# Patient Record
Sex: FEMALE | Race: WHITE | Employment: PART TIME | URBAN - METROPOLITAN AREA
[De-identification: names, ages, dates, MRNs, and addresses within clinical notes are randomized per-mention and may not be internally consistent; named-entity substitution may affect disease eponyms.]

---
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
## 2021-05-06 ENCOUNTER — HOSPITAL ENCOUNTER (OUTPATIENT)
Dept: ULTRASOUND IMAGING | Age: 21
Discharge: HOME | End: 2021-05-06
Payer: COMMERCIAL

## 2021-05-06 ENCOUNTER — HOSPITAL ENCOUNTER (OUTPATIENT)
Dept: WOMENS IMAGING | Age: 21
End: 2021-05-06
Payer: COMMERCIAL

## 2021-05-06 DIAGNOSIS — N63.0 LUMP OR MASS IN BREAST: ICD-10-CM

## 2021-05-06 PROCEDURE — 76642 ULTRASOUND BREAST LIMITED: CPT

## 2021-05-14 ENCOUNTER — OFFICE VISIT (OUTPATIENT)
Dept: PRIMARY CARE CLINIC | Age: 21
End: 2021-05-14
Payer: COMMERCIAL

## 2021-05-14 DIAGNOSIS — Z01.818 PRE-OP EXAMINATION: Primary | ICD-10-CM

## 2021-05-14 PROCEDURE — 99421 OL DIG E/M SVC 5-10 MIN: CPT | Performed by: NURSE PRACTITIONER

## 2021-05-15 LAB — SARS-COV-2: NOT DETECTED

## 2021-05-17 RX ORDER — DIPHENHYDRAMINE HYDROCHLORIDE 12.5 MG/1
12.5 BAR, CHEWABLE ORAL EVERY 6 HOURS PRN
COMMUNITY

## 2021-05-17 RX ORDER — CETIRIZINE HYDROCHLORIDE 10 MG/1
10 TABLET ORAL DAILY
COMMUNITY

## 2021-05-17 RX ORDER — DUPILUMAB 300 MG/2ML
INJECTION, SOLUTION SUBCUTANEOUS
COMMUNITY
Start: 2021-04-26

## 2021-05-17 RX ORDER — LEVONORGESTREL / ETHINYL ESTRADIOL AND ETHINYL ESTRADIOL 150-30(84)
KIT ORAL DAILY
COMMUNITY
Start: 2021-03-08

## 2021-05-17 NOTE — PROGRESS NOTES
C-Difficile admission screening and protocol:     * Admitted with diarrhea? n     *Prior history of C-Diff. In last 3 months?n     *Antibiotic use in the past 6-8 weeks?n     *Prior hospitalization or nursing home in the last month?n        [address] time____9__        Surgery time____________    Take the following medications with a sip of water:    Do not eat or drink anything after 12:00 midnight prior to your surgery. This includes water chewing gum, mints and ice chips. You may brush your teeth and gargle the morning of your surgery, but do not swallow the water     Please see your family doctor/pediatrician for a history and physical and/or concerning medications. Bring any test results/reports from your physicians office. If you are under the care of a heart doctor or specialist doctor, please be aware that you may be asked to them for clearance    You may be asked to stop blood thinners such as Coumadin, Plavix, Fragmin, Lovenox, etc., or any anti-inflammatories such as:  Aspirin, Ibuprofen, Advil, Naproxen prior to your surgery. We also ask that you stop any OTC medications such as fish oil, vitamin E, glucosamine, garlic, Multivitamins, COQ 10, etc.    We ask that you do not smoke 24 hours prior to surgery  We ask that you do not  drink any alcoholic beverages 24 hours prior to surgery     You must make arrangements for a responsible adult to take you home after your surgery. For your safety you will not be allowed to leave alone or drive yourself home. Your surgery will be cancelled if you do not have a ride home. Also for your safety, it is strongly suggested that someone stay with you the first 24 hours after your surgery. A parent or legal guardian must accompany a child scheduled for surgery and plan to stay at the hospital until the child is discharged. Please do not bring other children with you.     For your comfort, please wear simple loose fitting clothing to the hospital.  Please do not bring valuables. Do not wear any make-up or nail polish on your fingers or toes      For your safety, please do not wear any jewelry or body piercing's on the day of surgery. All jewelry must be removed. If you have dentures, they will be removed before going to operating room. For your convenience, we will provide you with a container. If you wear contact lenses or glasses, they will be removed, please bring a case for them. If you have a living will and a durable power of  for healthcare, please bring in a copy. As part of our patient safety program to minimize surgical site infections, we ask you to do the following:    · Please notify your surgeon if you develop any illness between         now and the  day of your surgery. · This includes a cough, cold, fever, sore throat, nausea,         or vomiting, and diarrhea, etc.  ·  Please notify your surgeon if you experience dizziness, shortness         of breath or blurred vision between now and the time of your surgery. Do not shave your operative site 96 hours prior to surgery. For face and neck surgery, men may use an electric razor 48 hours   prior to surgery. You may shower the night before surgery or the morning of   your surgery with an antibacterial soap. You will need to bring a photo ID and insurance card    [facility] has an onsite pharmacy, would you like to utilize our pharmacy     If you will be staying overnight and use a C-pap machine, please bring   your C-pap to hospital     Our goal is to provide you with excellent care, therefore, visitors will be limited to two(2) in the room at a time so that we may focus on providing this care for you. Please contact pre-admission testing if you have any further questions.                  [facility] phone number:  [phone] PAT fax number:  [phone]  Please note these are generalized instructions for all surgical cases, you may be provided with more specific instructions according to your surgery. Preoperative Screening for Elective Surgery/Invasive Procedures While COVID-19 present in the community     Have you tested positive or have been told to self-isolate for COVID-19 like symptoms within the past 28 days? n   Do you currently have any of the following symptoms? n  o Fever >100.0 F or 99.9 F in immunocompromised patients?n  o New onset cough, shortness of breath or difficulty breathing?n  o New onset sore throat, myalgia (muscle aches and pains), headache, loss of taste/smell or diarrhea?n   Have you had a potential exposure to COVID-19 within the past 14 days by:  o Close contact with a confirmed case?n  o Close contact with a healthcare worker,  or essential infrastructure worker (grocery store, [employer], gas station, public utilities or transportation)? YES  o Do you reside in a congregate setting such as; skilled nursing facility, adult home, correctional facility, homeless shelter or other institutional setting? n  o Have you had recent travel to a known COVID-19 hotspot?n    Indicate if the patient has a positive screen by answering yes to one or more of the above questions. Patients who test positive or screen positive prior to surgery or on the day of surgery should be evaluated in conjunction with the surgeon/proceduralist/anesthesiologist to determine the urgency of the procedure.

## 2021-05-19 ENCOUNTER — ANESTHESIA EVENT (OUTPATIENT)
Dept: OPERATING ROOM | Age: 21
End: 2021-05-19
Payer: COMMERCIAL

## 2021-05-20 ENCOUNTER — HOSPITAL ENCOUNTER (OUTPATIENT)
Age: 21
Setting detail: OUTPATIENT SURGERY
Discharge: HOME | End: 2021-05-20
Attending: SURGERY | Admitting: SURGERY
Payer: COMMERCIAL

## 2021-05-20 ENCOUNTER — ANESTHESIA (OUTPATIENT)
Dept: OPERATING ROOM | Age: 21
End: 2021-05-20
Payer: COMMERCIAL

## 2021-05-20 VITALS
WEIGHT: 135 LBS | HEIGHT: 66 IN | DIASTOLIC BLOOD PRESSURE: 72 MMHG | RESPIRATION RATE: 18 BRPM | TEMPERATURE: 97.8 F | HEART RATE: 78 BPM | OXYGEN SATURATION: 99 % | BODY MASS INDEX: 21.69 KG/M2 | SYSTOLIC BLOOD PRESSURE: 112 MMHG

## 2021-05-20 VITALS — OXYGEN SATURATION: 99 % | DIASTOLIC BLOOD PRESSURE: 55 MMHG | SYSTOLIC BLOOD PRESSURE: 91 MMHG

## 2021-05-20 DIAGNOSIS — N63.20 MASS OF LEFT BREAST: ICD-10-CM

## 2021-05-20 LAB — PREGNANCY, URINE: NEGATIVE

## 2021-05-20 PROCEDURE — 3700000001 HC ADD 15 MINUTES (ANESTHESIA): Performed by: SURGERY

## 2021-05-20 PROCEDURE — 3600000002 HC SURGERY LEVEL 2 BASE: Performed by: SURGERY

## 2021-05-20 PROCEDURE — 84703 CHORIONIC GONADOTROPIN ASSAY: CPT

## 2021-05-20 PROCEDURE — 2580000003 HC RX 258: Performed by: SURGERY

## 2021-05-20 PROCEDURE — 7100000010 HC PHASE II RECOVERY - FIRST 15 MIN: Performed by: SURGERY

## 2021-05-20 PROCEDURE — 6360000002 HC RX W HCPCS: Performed by: NURSE ANESTHETIST, CERTIFIED REGISTERED

## 2021-05-20 PROCEDURE — 7100000011 HC PHASE II RECOVERY - ADDTL 15 MIN: Performed by: SURGERY

## 2021-05-20 PROCEDURE — 7100000000 HC PACU RECOVERY - FIRST 15 MIN: Performed by: SURGERY

## 2021-05-20 PROCEDURE — 2500000003 HC RX 250 WO HCPCS: Performed by: SURGERY

## 2021-05-20 PROCEDURE — 6370000000 HC RX 637 (ALT 250 FOR IP): Performed by: ANESTHESIOLOGY

## 2021-05-20 PROCEDURE — 2580000003 HC RX 258: Performed by: ANESTHESIOLOGY

## 2021-05-20 PROCEDURE — 88305 TISSUE EXAM BY PATHOLOGIST: CPT

## 2021-05-20 PROCEDURE — 7100000001 HC PACU RECOVERY - ADDTL 15 MIN: Performed by: SURGERY

## 2021-05-20 PROCEDURE — 3600000012 HC SURGERY LEVEL 2 ADDTL 15MIN: Performed by: SURGERY

## 2021-05-20 PROCEDURE — 2709999900 HC NON-CHARGEABLE SUPPLY: Performed by: SURGERY

## 2021-05-20 PROCEDURE — 2500000003 HC RX 250 WO HCPCS: Performed by: NURSE ANESTHETIST, CERTIFIED REGISTERED

## 2021-05-20 PROCEDURE — 3700000000 HC ANESTHESIA ATTENDED CARE: Performed by: SURGERY

## 2021-05-20 RX ORDER — MORPHINE SULFATE 2 MG/ML
1 INJECTION, SOLUTION INTRAMUSCULAR; INTRAVENOUS EVERY 5 MIN PRN
Status: DISCONTINUED | OUTPATIENT
Start: 2021-05-20 | End: 2021-05-20 | Stop reason: HOSPADM

## 2021-05-20 RX ORDER — SODIUM CHLORIDE 9 MG/ML
25 INJECTION, SOLUTION INTRAVENOUS PRN
Status: DISCONTINUED | OUTPATIENT
Start: 2021-05-20 | End: 2021-05-20 | Stop reason: HOSPADM

## 2021-05-20 RX ORDER — LIDOCAINE HYDROCHLORIDE 20 MG/ML
INJECTION, SOLUTION EPIDURAL; INFILTRATION; INTRACAUDAL; PERINEURAL PRN
Status: DISCONTINUED | OUTPATIENT
Start: 2021-05-20 | End: 2021-05-20 | Stop reason: SDUPTHER

## 2021-05-20 RX ORDER — FENTANYL CITRATE 50 UG/ML
INJECTION, SOLUTION INTRAMUSCULAR; INTRAVENOUS PRN
Status: DISCONTINUED | OUTPATIENT
Start: 2021-05-20 | End: 2021-05-20 | Stop reason: SDUPTHER

## 2021-05-20 RX ORDER — PROPOFOL 10 MG/ML
INJECTION, EMULSION INTRAVENOUS CONTINUOUS PRN
Status: DISCONTINUED | OUTPATIENT
Start: 2021-05-20 | End: 2021-05-20 | Stop reason: SDUPTHER

## 2021-05-20 RX ORDER — ONDANSETRON 2 MG/ML
4 INJECTION INTRAMUSCULAR; INTRAVENOUS
Status: DISCONTINUED | OUTPATIENT
Start: 2021-05-20 | End: 2021-05-20 | Stop reason: HOSPADM

## 2021-05-20 RX ORDER — MEPERIDINE HYDROCHLORIDE 25 MG/ML
12.5 INJECTION INTRAMUSCULAR; INTRAVENOUS; SUBCUTANEOUS EVERY 5 MIN PRN
Status: DISCONTINUED | OUTPATIENT
Start: 2021-05-20 | End: 2021-05-20 | Stop reason: HOSPADM

## 2021-05-20 RX ORDER — MAGNESIUM HYDROXIDE 1200 MG/15ML
LIQUID ORAL CONTINUOUS PRN
Status: COMPLETED | OUTPATIENT
Start: 2021-05-20 | End: 2021-05-20

## 2021-05-20 RX ORDER — MIDAZOLAM HYDROCHLORIDE 1 MG/ML
INJECTION INTRAMUSCULAR; INTRAVENOUS PRN
Status: DISCONTINUED | OUTPATIENT
Start: 2021-05-20 | End: 2021-05-20 | Stop reason: SDUPTHER

## 2021-05-20 RX ORDER — OXYCODONE HYDROCHLORIDE AND ACETAMINOPHEN 5; 325 MG/1; MG/1
2 TABLET ORAL PRN
Status: COMPLETED | OUTPATIENT
Start: 2021-05-20 | End: 2021-05-20

## 2021-05-20 RX ORDER — HYDROCODONE BITARTRATE AND ACETAMINOPHEN 5; 325 MG/1; MG/1
1 TABLET ORAL EVERY 6 HOURS PRN
Qty: 8 TABLET | Refills: 0 | Status: SHIPPED | OUTPATIENT
Start: 2021-05-20 | End: 2021-05-25

## 2021-05-20 RX ORDER — PROPOFOL 10 MG/ML
INJECTION, EMULSION INTRAVENOUS PRN
Status: DISCONTINUED | OUTPATIENT
Start: 2021-05-20 | End: 2021-05-20 | Stop reason: SDUPTHER

## 2021-05-20 RX ORDER — LIDOCAINE HYDROCHLORIDE 10 MG/ML
INJECTION, SOLUTION INFILTRATION; PERINEURAL
Status: COMPLETED | OUTPATIENT
Start: 2021-05-20 | End: 2021-05-20

## 2021-05-20 RX ORDER — ONDANSETRON 4 MG/1
4 TABLET, FILM COATED ORAL EVERY 8 HOURS PRN
Qty: 2 TABLET | Refills: 1 | Status: SHIPPED | OUTPATIENT
Start: 2021-05-20

## 2021-05-20 RX ORDER — MORPHINE SULFATE 2 MG/ML
2 INJECTION, SOLUTION INTRAMUSCULAR; INTRAVENOUS EVERY 5 MIN PRN
Status: DISCONTINUED | OUTPATIENT
Start: 2021-05-20 | End: 2021-05-20 | Stop reason: HOSPADM

## 2021-05-20 RX ORDER — OXYCODONE HYDROCHLORIDE AND ACETAMINOPHEN 5; 325 MG/1; MG/1
1 TABLET ORAL PRN
Status: COMPLETED | OUTPATIENT
Start: 2021-05-20 | End: 2021-05-20

## 2021-05-20 RX ORDER — LABETALOL HYDROCHLORIDE 5 MG/ML
5 INJECTION, SOLUTION INTRAVENOUS EVERY 10 MIN PRN
Status: DISCONTINUED | OUTPATIENT
Start: 2021-05-20 | End: 2021-05-20 | Stop reason: HOSPADM

## 2021-05-20 RX ORDER — SODIUM CHLORIDE 0.9 % (FLUSH) 0.9 %
5-40 SYRINGE (ML) INJECTION EVERY 12 HOURS SCHEDULED
Status: DISCONTINUED | OUTPATIENT
Start: 2021-05-20 | End: 2021-05-20 | Stop reason: HOSPADM

## 2021-05-20 RX ORDER — SODIUM CHLORIDE 9 MG/ML
INJECTION, SOLUTION INTRAVENOUS CONTINUOUS
Status: DISCONTINUED | OUTPATIENT
Start: 2021-05-20 | End: 2021-05-20 | Stop reason: HOSPADM

## 2021-05-20 RX ORDER — SODIUM CHLORIDE 0.9 % (FLUSH) 0.9 %
5-40 SYRINGE (ML) INJECTION PRN
Status: DISCONTINUED | OUTPATIENT
Start: 2021-05-20 | End: 2021-05-20 | Stop reason: HOSPADM

## 2021-05-20 RX ORDER — HYDRALAZINE HYDROCHLORIDE 20 MG/ML
5 INJECTION INTRAMUSCULAR; INTRAVENOUS
Status: DISCONTINUED | OUTPATIENT
Start: 2021-05-20 | End: 2021-05-20 | Stop reason: HOSPADM

## 2021-05-20 RX ADMIN — FENTANYL CITRATE 50 MCG: 50 INJECTION INTRAMUSCULAR; INTRAVENOUS at 10:58

## 2021-05-20 RX ADMIN — SODIUM CHLORIDE: 9 INJECTION, SOLUTION INTRAVENOUS at 09:48

## 2021-05-20 RX ADMIN — LIDOCAINE HYDROCHLORIDE 100 MG: 20 INJECTION, SOLUTION EPIDURAL; INFILTRATION; INTRACAUDAL; PERINEURAL at 10:59

## 2021-05-20 RX ADMIN — PROPOFOL 140 MCG/KG/MIN: 10 INJECTION, EMULSION INTRAVENOUS at 10:58

## 2021-05-20 RX ADMIN — PROPOFOL 50 MG: 10 INJECTION, EMULSION INTRAVENOUS at 11:01

## 2021-05-20 RX ADMIN — PROPOFOL 50 MG: 10 INJECTION, EMULSION INTRAVENOUS at 10:59

## 2021-05-20 RX ADMIN — OXYCODONE HYDROCHLORIDE AND ACETAMINOPHEN 1 TABLET: 5; 325 TABLET ORAL at 13:33

## 2021-05-20 RX ADMIN — MIDAZOLAM 2 MG: 1 INJECTION INTRAMUSCULAR; INTRAVENOUS at 10:53

## 2021-05-20 RX ADMIN — FENTANYL CITRATE 50 MCG: 50 INJECTION INTRAMUSCULAR; INTRAVENOUS at 11:06

## 2021-05-20 ASSESSMENT — PULMONARY FUNCTION TESTS
PIF_VALUE: 1
PIF_VALUE: 0
PIF_VALUE: 1

## 2021-05-20 ASSESSMENT — PAIN DESCRIPTION - LOCATION
LOCATION: BREAST
LOCATION: BREAST

## 2021-05-20 ASSESSMENT — ENCOUNTER SYMPTOMS: SHORTNESS OF BREATH: 0

## 2021-05-20 ASSESSMENT — PAIN DESCRIPTION - FREQUENCY
FREQUENCY: CONTINUOUS
FREQUENCY: CONTINUOUS

## 2021-05-20 ASSESSMENT — PAIN DESCRIPTION - PROGRESSION: CLINICAL_PROGRESSION: NOT CHANGED

## 2021-05-20 ASSESSMENT — PAIN DESCRIPTION - ORIENTATION
ORIENTATION: LEFT
ORIENTATION: LEFT

## 2021-05-20 ASSESSMENT — PAIN DESCRIPTION - PAIN TYPE: TYPE: SURGICAL PAIN

## 2021-05-20 ASSESSMENT — PAIN DESCRIPTION - DESCRIPTORS: DESCRIPTORS: ACHING

## 2021-05-20 ASSESSMENT — PAIN DESCRIPTION - ONSET: ONSET: ON-GOING

## 2021-05-20 ASSESSMENT — PAIN - FUNCTIONAL ASSESSMENT: PAIN_FUNCTIONAL_ASSESSMENT: ACTIVITIES ARE NOT PREVENTED

## 2021-05-20 ASSESSMENT — PAIN SCALES - GENERAL: PAINLEVEL_OUTOF10: 4

## 2021-05-20 NOTE — ANESTHESIA PRE PROCEDURE
[facility] Department of Anesthesiology  Pre-Anesthesia Evaluation/Consultation       Name:  Connie Walker  : 2000  Age:  22 y.o. MRN:  [id number]  Date: 2021           Surgeon: Surgeon(s):  Anne Torres MD    Procedure: Procedure(s):  LEFT EXCISIONAL BREAST BIOPSY     Allergies   Allergen Reactions    Peanut-Containing Drug Products Hives and Other (See Comments)    Singulair [Montelukast]      Had anxiety with Singulair. There is no problem list on file for this patient. Past Medical History:   Diagnosis Date    Asthma     Breast mass     Hives      Past Surgical History:   Procedure Laterality Date    SKIN CANCER EXCISION       Social History     Tobacco Use    Smoking status: Never Smoker    Smokeless tobacco: Never Used   Vaping Use    Vaping Use: Never used   Substance Use Topics    Alcohol use: Yes     Comment: occ.  Drug use: Never     Medications  No current facility-administered medications on file prior to encounter.      Current Outpatient Medications on File Prior to Encounter   Medication Sig Dispense Refill    albuterol (PROVENTIL) (5 MG/ML) 0.5% nebulizer solution Inhale 5 mg into the lungs every 4 hours      Levonorgest-Eth Estrad -Day 0.15-0.03 &0.01 MG TABS daily       cetirizine (ZYRTEC) 10 MG tablet Take 10 mg by mouth daily      diphenhydrAMINE (BENADRYL) 12.5 MG chewable tablet Take 12.5 mg by mouth every 6 hours as needed      DUPIXENT 300 MG/2ML SOSY injection every 14 days        Current Facility-Administered Medications   Medication Dose Route Frequency Provider Last Rate Last Admin    0.9 % sodium chloride infusion   Intravenous Continuous Alton Sherman MD 75 mL/hr at 21 0948 New Bag at 21 0948    sodium chloride flush 0.9 % injection 5-40 mL  5-40 mL Intravenous 2 times per day Alton Sherman MD        sodium chloride flush 0.9 % injection 5-40 mL  5-40 mL Intravenous PRN Debbi Pulido MD        0.9 % sodium chloride infusion  25 mL Intravenous PRN Debbi Pulido MD         Vital Signs (Current)   Vitals:    21 1350 21   BP:  123/73   Pulse:  91   Resp:  18   Temp:  98 °F (36.7 °C)   TempSrc:  Temporal   SpO2:  99%   Weight: 135 lb (61.2 kg) 135 lb (61.2 kg)   Height: 5' 6\" (1.676 m) 5' 6\" (1.676 m)                                          BP Readings from Last 3 Encounters:   21 123/73     Vital Signs Statistics (for past 48 hrs)     Temp  Av °F (36.7 °C)  Min: 98 °F (36.7 °C)   Min taken time: 21  Max: 98 °F (36.7 °C)   Max taken time: 21  Pulse  Av  Min: 91   Min taken time: 21  Max: 91   Max taken time: 21  Resp  Av  Min: 18   Min taken time: 21  Max: 18   Max taken time: 21  BP  Min: 123/73   Min taken time: 21  Max: 123/73   Max taken time: 21  SpO2  Av %  Min: 99 %   Min taken time: 21  Max: 99 %   Max taken time: 21  BP Readings from Last 3 Encounters:   21 123/73       BMI  Body mass index is 21.79 kg/m². Estimated body mass index is 21.79 kg/m² as calculated from the following:    Height as of this encounter: 5' 6\" (1.676 m). Weight as of this encounter: 135 lb (61.2 kg).     CBC   Lab Results   Component Value Date    WBC 9.8 2011    RBC 4.60 2011    HGB 13.9 2011    HCT 39.8 2011    MCV 86.6 2011    RDW 12.4 2011     2011     CMP    Lab Results   Component Value Date     2011    K 4.0 2011     2011    CO2 21 2011    BUN 11 2011    CREATININE 0.4 2011    GFRAA >60 2011    AGRATIO 1.7 2011    GLUCOSE 105 2011    PROT 8.3 2011    CALCIUM 10.6 2011    BILITOT 0.40 2011    ALKPHOS 250 2011    AST 33 2011    ALT 17 2011     BMP    Lab Results   Component Value Date     12/06/2011    K 4.0 12/06/2011     12/06/2011    CO2 21 12/06/2011    BUN 11 12/06/2011    CREATININE 0.4 12/06/2011    CALCIUM 10.6 12/06/2011    GFRAA >60 12/06/2011    GLUCOSE 105 12/06/2011     POCGlucose  No results for input(s): GLUCOSE in the last 72 hours. Coags  No results found for: PROTIME, INR, APTT  HCG (If Applicable)   Lab Results   Component Value Date    PREGTESTUR Negative 05/20/2021      ABGs No results found for: PHART, PO2ART, COD7OIX, RZX7WII, BEART, Z3UMQBTG   Type & Screen (If Applicable)  No results found for: LABABO, LABRH                         BMI: Wt Readings from Last 3 Encounters:       NPO Status:   Date of last liquid consumption: 05/19/21   Time of last liquid consumption: 2300   Date of last solid food consumption: 05/19/21      Time of last solid consumption: 2100       Anesthesia Evaluation  Patient summary reviewed  Airway: Mallampati: II  TM distance: >3 FB   Neck ROM: full  Mouth opening: > = 3 FB Dental: normal exam         Pulmonary:   (+) asthma: seasonal asthma,     (-) COPD and shortness of breath                           Cardiovascular:        (-) hypertension, valvular problems/murmurs, past MI, CAD, CABG/stent, dysrhythmias and  angina                Neuro/Psych:      (-) seizures, TIA and CVA           GI/Hepatic/Renal:        (-) GERD, PUD, liver disease and no renal disease       Endo/Other:        (-) diabetes mellitus               Abdominal:           Vascular: negative vascular ROS. Anesthesia Plan      MAC     ASA 2     (I discussed intravenous sedation to the patient's satisfaction including risks and alternatives. The patient agreed with the plan and has no further questions. Jeffrey Vu MD )  Induction: intravenous. Anesthetic plan and risks discussed with patient. Plan discussed with CRNA.                   This pre-anesthesia assessment may be used as a history and physical.    DOS STAFF ADDENDUM:    Pt seen and examined, chart reviewed (including anesthesia, drug and allergy history). No interval changes to history and physical examination. Anesthetic plan, risks, benefits, alternatives, and personnel involved discussed with patient. Patient verbalized an understanding and agrees to proceed.       Rodriguez Joel MD  May 20, 2021  10:00 AM

## 2021-05-20 NOTE — PROGRESS NOTES
Feeling \"better\" and states she is ready to go home. Skin warm and dry. Denies nausea. Stable for discharge.

## 2021-05-20 NOTE — PROGRESS NOTES
Tolerating oral intake. Medicated for pain. Discharge instructions given to patient and mother. Verbalize understanding. Script received from [facility].

## 2021-05-20 NOTE — OP NOTE
identified  within the breast.  Hemostasis was obtained with Bovie cautery. The  subcutaneous tissue was closed with interrupted 3-0 Vicryl suture, and  the skin was closed with a 4-0 Vicryl subcuticular stitch. Benzoin,  Steris, dry sterile gauze, and Tegaderm dressing were applied. All  needle and sponge counts were correct. The patient was returned to recovery in good condition, and I was  present for the entire procedure.         Clemencia Barton MD    D: 05/20/2021 18:21:24       T: 05/20/2021 18:29:34     [email or web address]  Job#: [id number]     Doc#: [id number]    CC:  Angela Gupta MD

## 2021-05-20 NOTE — ANESTHESIA POSTPROCEDURE EVALUATION
Department of Anesthesiology  Postprocedure Note    Patient: Little Castañeda  MRN: [id number]  YOB: 2000  Date of evaluation: 5/20/2021  Time:  1:15 PM     Procedure Summary     Date: 05/20/21 Room / Location:  [facility]    Anesthesia Start: 0491 Anesthesia Stop: 6917    Procedure: LEFT EXCISIONAL BREAST BIOPSY (Left Breast) Diagnosis:       Mass of left breast      (LEFT BREAST MASS)    Surgeons: Huma Maier MD Responsible Provider: Elham Lopez MD    Anesthesia Type: MAC ASA Status: 2          Anesthesia Type: MAC    Sonam Phase I: Sonam Score: 9    Sonam Phase II: Sonam Score: 10    Last vitals: Reviewed and per EMR flowsheets.        Anesthesia Post Evaluation    Patient location during evaluation: PACU  Patient participation: complete - patient participated  Level of consciousness: awake and alert  Pain score: 0  Airway patency: patent  Nausea & Vomiting: no nausea and no vomiting  Complications: no  Cardiovascular status: blood pressure returned to baseline  Respiratory status: acceptable  Hydration status: euvolemic

## 2021-05-20 NOTE — PROGRESS NOTES
Complains of feeling nauseated and hot with IV removal.  Resting in recliner with feet elevated. Cold wash cloth to forehead. States she is feeling better. Instructed to rest awhile.   Cold drink given per patient's request.

## 2021-05-20 NOTE — BRIEF OP NOTE
Brief Postoperative Note    Name           : Ricarda Vilchis  YOB: 2000  MRN             : [id number]    Pre-operative Diagnosis: 1. Left breast mass 1200  2. Abnormal left breast ultrasound    Post-operative Diagnosis: Same    Procedure: 1. Left excisional breast biopsy    Anesthesia: MAC and Local    Surgeons/Assistants: Joi    Estimated Blood Loss: less than 50     Complications: None    Specimens: Was Obtained: 1.  Left breast mass 1200    Findings: same as post op    Electronically signed by Chavez Carcamo MD on 5/20/2021 at 11:24 AM

## 2021-05-20 NOTE — PROGRESS NOTES
Patient received from PACU awake, alert, VSS. Lt. Breast dressing dry and intact to site, patient denies pain. Snack provided. Call light within reach.

## (undated) DEVICE — SOLUTION SCRB 4OZ 10% POVIDONE IOD ANTIMIC BTL

## (undated) DEVICE — SOLUTION PREP POVIDONE IOD FOR SKIN MUCOUS MEM PRIOR TO

## (undated) DEVICE — ELECTRODE PT RET AD L9FT HI MOIST COND ADH HYDRGEL CORDED

## (undated) DEVICE — TOWEL,OR,DSP,ST,BLUE,STD,4/PK,20PK/CS: Brand: MEDLINE

## (undated) DEVICE — GOWN SIRUS NONREIN XL W/TWL: Brand: MEDLINE INDUSTRIES, INC.

## (undated) DEVICE — Z DISCONTINUED BY MEDLINE USE 2711682 TRAY SKIN PREP DRY W/ PREM GLV

## (undated) DEVICE — SPONGE GZ W4XL4IN COT 12 PLY TYP VII WVN C FLD DSGN

## (undated) DEVICE — SOLUTION IV IRRIG POUR BRL 0.9% SODIUM CHL 2F7124

## (undated) DEVICE — SUTURE VCRL SZ 3-0 L27IN ABSRB UD L26MM SH 1/2 CIR J416H

## (undated) DEVICE — MERCY HEALTH WEST TURNOVER: Brand: MEDLINE INDUSTRIES, INC.

## (undated) DEVICE — SUTURE VCRL SZ 4-0 L18IN ABSRB UD L19MM PS-2 3/8 CIR PRIM J496H

## (undated) DEVICE — PENCIL ES L3M BTTN SWCH S STL HEX LOK BLDE ELECTRD HOLSTER

## (undated) DEVICE — GAUZE,SPONGE,4"X4",16PLY,XRAY,STRL,LF: Brand: MEDLINE

## (undated) DEVICE — DRAPE,T,LAPARO,TRANS,STERILE: Brand: MEDLINE

## (undated) DEVICE — 3M™ STERI-STRIP™ COMPOUND BENZOIN TINCTURE 40 BAGS/CARTON 4 CARTONS/CASE C1544: Brand: 3M™ STERI-STRIP™

## (undated) DEVICE — CLIP LIG M TI 6 SIL HNDL FOR OPN AND ENDOSCP SGL APPL

## (undated) DEVICE — GLOVE,SURG,SENSICARE SLT,LF,PF,7: Brand: MEDLINE

## (undated) DEVICE — MINOR SET UP PK

## (undated) DEVICE — SYRINGE MED 10ML LUERLOCK TIP W/O SFTY DISP

## (undated) DEVICE — NEEDLE HYPO 25GA L1.5IN BVL ORIENTED ECLIPSE

## (undated) DEVICE — CLEANER,CAUTERY TIP,2X2",STERILE: Brand: MEDLINE

## (undated) DEVICE — CANISTER, RIGID, 1200CC: Brand: MEDLINE INDUSTRIES, INC.

## (undated) DEVICE — COVER LT HNDL BLU PLAS

## (undated) DEVICE — STRIP,CLOSURE,WOUND,MEDI-STRIP,1/2X4: Brand: MEDLINE

## (undated) DEVICE — 3M™ TEGADERM™ TRANSPARENT FILM DRESSING FRAME STYLE, 1626W, 4 IN X 4-3/4 IN (10 CM X 12 CM), 50/CT 4CT/CASE: Brand: 3M™ TEGADERM™